# Patient Record
Sex: FEMALE | Race: WHITE | NOT HISPANIC OR LATINO | URBAN - NONMETROPOLITAN AREA
[De-identification: names, ages, dates, MRNs, and addresses within clinical notes are randomized per-mention and may not be internally consistent; named-entity substitution may affect disease eponyms.]

---

## 2024-05-08 ENCOUNTER — OFFICE VISIT (OUTPATIENT)
Dept: PSYCHIATRY | Facility: CLINIC | Age: 54
End: 2024-05-08

## 2024-05-08 DIAGNOSIS — F31.81 BIPOLAR II DISORDER: ICD-10-CM

## 2024-05-08 DIAGNOSIS — F33.3 MAJOR DEPRESSIVE DISORDER, RECURRENT EPISODE, SEVERE, WITH PSYCHOSIS: Primary | ICD-10-CM

## 2024-05-08 DIAGNOSIS — F43.10 POST TRAUMATIC STRESS DISORDER (PTSD): ICD-10-CM

## 2024-05-08 DIAGNOSIS — F41.1 GENERALIZED ANXIETY DISORDER: ICD-10-CM

## 2024-05-08 PROCEDURE — 1159F MED LIST DOCD IN RCRD: CPT | Performed by: SOCIAL WORKER

## 2024-05-08 PROCEDURE — 1160F RVW MEDS BY RX/DR IN RCRD: CPT | Performed by: SOCIAL WORKER

## 2024-05-08 PROCEDURE — 90837 PSYTX W PT 60 MINUTES: CPT | Performed by: SOCIAL WORKER

## 2024-07-18 ENCOUNTER — OFFICE VISIT (OUTPATIENT)
Dept: PSYCHIATRY | Facility: CLINIC | Age: 54
End: 2024-07-18
Payer: COMMERCIAL

## 2024-07-18 DIAGNOSIS — F41.1 GENERALIZED ANXIETY DISORDER: ICD-10-CM

## 2024-07-18 DIAGNOSIS — F43.10 POST TRAUMATIC STRESS DISORDER (PTSD): ICD-10-CM

## 2024-07-18 DIAGNOSIS — F33.3 MAJOR DEPRESSIVE DISORDER, RECURRENT EPISODE, SEVERE, WITH PSYCHOSIS: Primary | ICD-10-CM

## 2024-07-18 DIAGNOSIS — F31.81 BIPOLAR II DISORDER: ICD-10-CM
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
## 2024-08-16 ENCOUNTER — OFFICE VISIT (OUTPATIENT)
Dept: PSYCHIATRY | Facility: CLINIC | Age: 54
End: 2024-08-16
Payer: COMMERCIAL

## 2024-08-16 DIAGNOSIS — F43.10 POST TRAUMATIC STRESS DISORDER (PTSD): ICD-10-CM

## 2024-08-16 DIAGNOSIS — F31.81 BIPOLAR II DISORDER: ICD-10-CM

## 2024-08-16 DIAGNOSIS — F33.3 MAJOR DEPRESSIVE DISORDER, RECURRENT EPISODE, SEVERE, WITH PSYCHOSIS: Primary | ICD-10-CM

## 2024-08-16 DIAGNOSIS — F41.1 GENERALIZED ANXIETY DISORDER: ICD-10-CM
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
## 2024-09-19 ENCOUNTER — OFFICE VISIT (OUTPATIENT)
Dept: PSYCHIATRY | Facility: CLINIC | Age: 54
End: 2024-09-19
Payer: COMMERCIAL

## 2024-09-19 DIAGNOSIS — F33.3 MAJOR DEPRESSIVE DISORDER, RECURRENT EPISODE, SEVERE, WITH PSYCHOSIS: Primary | ICD-10-CM

## 2024-09-19 DIAGNOSIS — F31.81 BIPOLAR II DISORDER: ICD-10-CM

## 2024-09-19 DIAGNOSIS — F41.1 GENERALIZED ANXIETY DISORDER: ICD-10-CM

## 2024-09-19 DIAGNOSIS — F43.10 POST TRAUMATIC STRESS DISORDER (PTSD): ICD-10-CM

## 2024-09-19 DIAGNOSIS — F44.81 DISSOCIATIVE IDENTITY DISORDER: ICD-10-CM

## 2024-10-10 ENCOUNTER — OFFICE VISIT (OUTPATIENT)
Dept: PSYCHIATRY | Facility: CLINIC | Age: 54
End: 2024-10-10
Payer: COMMERCIAL

## 2024-10-10 DIAGNOSIS — F33.3 MAJOR DEPRESSIVE DISORDER, RECURRENT EPISODE, SEVERE, WITH PSYCHOSIS: Primary | ICD-10-CM

## 2024-10-10 DIAGNOSIS — F31.81 BIPOLAR II DISORDER: ICD-10-CM

## 2024-10-10 DIAGNOSIS — F43.10 POST TRAUMATIC STRESS DISORDER (PTSD): ICD-10-CM

## 2024-10-10 DIAGNOSIS — F44.81 DISSOCIATIVE IDENTITY DISORDER: ICD-10-CM

## 2024-10-10 DIAGNOSIS — F41.1 GENERALIZED ANXIETY DISORDER: ICD-10-CM

## 2024-10-10 NOTE — PROGRESS NOTES
Date: October 10, 2024  Time In: 2:40 pm.  Time Out: 3:40 pm.      PROGRESS NOTE  Data:  Lachelle Clayton is a 54 y.o. female who presents today for individual therapy session at [facility] with Lani Ware LCSW, [facility].  Patient comes in reporting that she is all over the place.  Patient states she is just so tired of fighting and feels very hopeless and helpless at present time.  Patient reports she has accumulated so much debt for herself and has been borrowing money from her parents month-to-month to pay what she owes.  Patient reports that she continues to have nightmares and flashbacks nightly.  Patient reports that she just stays worn out and cannot think or even function.  Patient reports her stressors of having no income and feeling no escape from her depression leading her to wanting to just give up.  Patient reports that she has the obsession to keep her house clean but then not leaving her house unless she has to.  Patient reports that her daughter age 24 who is  has thyroid cancer and had surgery 2 weeks ago.  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  Patient reports not hearing from disability and is very anxious about not knowing what is going to happen.  Patient reports scalene her depression level and 9 and her anxiety level a 10.  Patient denies having any thoughts to harm herself or others at present time.      Clinical Maneuvering/Intervention:    (Scales based on 0 - 10 with 10 being the worst)  Depression: 9 Anxiety: 10       Assisted patient in processing above session content; acknowledged and normalized patient’s thoughts, feelings, and concerns.  Rationalized patient thought process regarding mood swings, depression, and anxiety and trauma.  Discussed triggers associated with patient's past traumas.  Also discussed coping skills for patient to implement such as deep breathing and positive self-talk.  Allow patient much ventilation  regarding her depressed feelings.  Patient's feelings were normalized.  Patient was encouraged to focus on living 1 day at a time focusing on the things she can change instead of what she cannot which is only her self to decrease her depression.  Assisted patient in exploring her reasons for wanting to die.  Patient was able to identify that death is final.  Patient was able to identify that it is unknown as to what happens after death.  Patient was able to state that as long as she is alive she has choices.  Patient was encouraged to continue journal writing and process in session her past traumas in order to reach a solution.  Patient was able to identify that her oskar does keep her going.  Patient also identified that she has her children and  who need her and she needs them.  Allowed patient to freely discuss issues without interruption or judgment. Provided safe, confidential environment to facilitate the development of positive therapeutic relationship and encourage open, honest communication. Assisted patient in identifying risk factors which would indicate the need for higher level of care including thoughts to harm self or others and/or self-harming behavior and encouraged patient to contact this office, call 911, or present to the nearest emergency room should any of these events occur. Discussed crisis intervention services and means to access. Patient adamantly and convincingly denies current suicidal or homicidal ideation or perceptual disturbance.    Assessment   Patient appears to maintain relative stability as compared to their baseline.  However, patient continues to struggle with prescient anxiety trauma and mood swings which continues to cause impairment in important areas of functioning.  A result, they can be reasonably expected to continue to benefit from treatment and would likely be at increased risk for decompensation otherwise.  Patient's diagnosis is bipolar 2 disorder, generalized  anxiety disorder, major depressive disorder, recurrent, severe with psychosis, posttraumatic stress disorder, and dissociative identity disorder.  Patient having increased stress dealing with financial problems and worse depression.  Patient denying present suicidal or homicidal ideations.      ICD-10-CM ICD-9-CM   1. Major depressive disorder, recurrent episode, severe, with psychosis  F33.3 296.34   2. Generalized anxiety disorder  F41.1 300.02   3. Post traumatic stress disorder (PTSD)  F43.10 309.81   4. Bipolar II disorder  F31.81 296.89   5. Dissociative identity disorder  F44.81 300.14       Mental Status Exam:   Hygiene:   good  Cooperation:  Cooperative  Eye Contact:  Good  Psychomotor Behavior:  Appropriate  Affect:  Appropriate  Mood: depressed  Speech:  Normal  Thought Process:  Goal directed  Thought Content:  Normal  Suicidal:   thoughts of giving up.  No plan  Homicidal:  None  Hallucinations:  None  Delusion:  None  Memory:  Intact  Orientation:  Person, Place, Time, and Situation  Reliability:  good  Insight:  Fair  Judgement:  Fair  Impulse Control:  Fair  Physical/Medical Issues:  Yes obese        Patient's Support Network Includes:  , children, parents, and extended family    Functional Status: Severe impairment    Progress toward goal: Not at goal    Prognosis: Good with Ongoing Treatment          Plan     Patient will continue in individual outpatient therapy with focus on improved functioning and coping skills, maintaining stability, and avoiding decompensation and the need for higher level of care.  Patient will return in 2 weeks for positive coping skills and cognitive therapy.  Patient will continue to apply positive coping skills of eating healthy, sticking to a daily schedule, plying positive self-talk, and taking her medication as prescribed to maintain her stability.  Patient will focus on living 1 day at a time focusing on the things she can change instead of what she cannot  which is only her self to decrease her depression and anxiety.  Patient will continue to process her past traumas through journal writing and in session in order to reach resolution.  Patient will be aware of her negative thinking and reframe to positive by questioning the facts and what she would like to believe about herself to decrease her depression and anxiety.  Patient will continue to be aware of her anxiety and talk back to it instead of saying what if to say so what I can handle it I have before.  Patient will adhere to medication regimen as prescribed and report any side effects. Patient will contact this office, call 911 or present to the nearest emergency room should suicidal or homicidal ideations occur. Provide Cognitive Behavioral Therapy and Solution Focused Therapy to improve functioning, maintain stability, and avoid decompensation and the need for higher level of care.     Follow up Return in about 2 weeks (around 10/24/2024).  or earlier if symptoms worsen or fail to improve.             This document has been electronically signed by Lani Vázquez LCSW, October 10, 2024, 15:55 EDT    Part of this note may be an electronic transcription/translation of spoken language to printed text using the Dragon Dictation System.

## 2025-08-14 ENCOUNTER — TELEMEDICINE (OUTPATIENT)
Dept: PSYCHIATRY | Facility: CLINIC | Age: 55
End: 2025-08-14
Payer: COMMERCIAL

## 2025-08-14 DIAGNOSIS — F44.81 DISSOCIATIVE IDENTITY DISORDER: ICD-10-CM

## 2025-08-14 DIAGNOSIS — F33.3 MAJOR DEPRESSIVE DISORDER, RECURRENT EPISODE, SEVERE, WITH PSYCHOSIS: Primary | ICD-10-CM

## 2025-08-14 DIAGNOSIS — F43.10 POST TRAUMATIC STRESS DISORDER (PTSD): ICD-10-CM

## 2025-08-14 DIAGNOSIS — F41.1 GENERALIZED ANXIETY DISORDER: ICD-10-CM

## 2025-08-14 DIAGNOSIS — F31.81 BIPOLAR II DISORDER: ICD-10-CM

## 2025-08-28 ENCOUNTER — TELEMEDICINE (OUTPATIENT)
Dept: PSYCHIATRY | Facility: CLINIC | Age: 55
End: 2025-08-28
Payer: COMMERCIAL

## 2025-08-28 DIAGNOSIS — F41.1 GENERALIZED ANXIETY DISORDER: ICD-10-CM

## 2025-08-28 DIAGNOSIS — F44.81 DISSOCIATIVE IDENTITY DISORDER: ICD-10-CM

## 2025-08-28 DIAGNOSIS — F43.10 POST TRAUMATIC STRESS DISORDER (PTSD): ICD-10-CM

## 2025-08-28 DIAGNOSIS — F31.81 BIPOLAR II DISORDER: ICD-10-CM

## 2025-08-28 DIAGNOSIS — F33.3 MAJOR DEPRESSIVE DISORDER, RECURRENT EPISODE, SEVERE, WITH PSYCHOSIS: Primary | ICD-10-CM
